# Patient Record
Sex: FEMALE | URBAN - METROPOLITAN AREA
[De-identification: names, ages, dates, MRNs, and addresses within clinical notes are randomized per-mention and may not be internally consistent; named-entity substitution may affect disease eponyms.]

---

## 2021-11-29 NOTE — PROGRESS NOTES
Chief Complaint   Other (pregnancy confirmation)      MAIKEL Sanchez is a 24 y.o. female who presents for the evaluation of pregnancy confirmation. Patient's last menstrual period was 10/19/2021 (exact date). She is 6 weeks by dates. Her menses are regular. The patient states she received 2 positive UPTs about 1 week ago. No past medical history on file. No past surgical history on file. Social History     Occupational History    Not on file   Tobacco Use    Smoking status: Never Smoker    Smokeless tobacco: Never Used   Vaping Use    Vaping Use: Never used   Substance and Sexual Activity    Alcohol use: Not Currently    Drug use: Never    Sexual activity: Yes     Partners: Male     Birth control/protection: None     No family history on file.     No Known Allergies  Prior to Admission medications    Not on File        Review of Systems: History obtained from the patient  Constitutional: negative for weight loss, fever, night sweats  HEENT: negative for hearing loss, earache, congestion, snoring, sorethroat  CV: negative for chest pain, palpitations, edema  Resp: negative for cough, shortness of breath, wheezing  Breast: negative for breast lumps, nipple discharge, galactorrhea  GI: negative for change in bowel habits, abdominal pain, black or bloody stools  : negative for frequency, dysuria, hematuria, vaginal discharge  MSK: negative for back pain, joint pain, muscle pain  Skin: negative for itching, rash, hives  Neuro: negative for dizziness, headache, confusion, weakness  Psych: negative for anxiety, depression, change in mood  Heme/lymph: negative for bleeding, bruising, pallor    Objective:  Visit Vitals  /62   Wt 123 lb (55.8 kg)   LMP 10/19/2021 (Exact Date)       Physical Exam:   PHYSICAL EXAMINATION    Constitutional  · Appearance: well-nourished, well developed, alert, in no acute distress      Skin  · General Inspection: no rash, no lesions identified    Neurologic/Psychiatric  · Mental Status:  · Orientation: grossly oriented to person, place and time  · Mood and Affect: mood normal, affect appropriate    UPT positive    Assessment:   IUP 6 weeks    Plan:   Given Phenergan for nausea; start PNV  RTO 2 weeks for US and NOB appt

## 2021-11-30 ENCOUNTER — OFFICE VISIT (OUTPATIENT)
Dept: OBGYN CLINIC | Age: 21
End: 2021-11-30
Payer: COMMERCIAL

## 2021-11-30 VITALS — WEIGHT: 123 LBS | DIASTOLIC BLOOD PRESSURE: 62 MMHG | SYSTOLIC BLOOD PRESSURE: 100 MMHG

## 2021-11-30 DIAGNOSIS — N92.6 MISSED MENSES: Primary | ICD-10-CM

## 2021-11-30 LAB
HCG URINE, QL. (POC): POSITIVE
VALID INTERNAL CONTROL?: YES

## 2021-11-30 PROCEDURE — 81025 URINE PREGNANCY TEST: CPT | Performed by: OBSTETRICS & GYNECOLOGY

## 2021-11-30 PROCEDURE — 99203 OFFICE O/P NEW LOW 30 MIN: CPT | Performed by: OBSTETRICS & GYNECOLOGY

## 2021-11-30 RX ORDER — PROMETHAZINE HYDROCHLORIDE 25 MG/1
25 TABLET ORAL
Qty: 30 TABLET | Refills: 1 | Status: SHIPPED | OUTPATIENT
Start: 2021-11-30

## 2021-11-30 NOTE — PATIENT INSTRUCTIONS
ãä ÇáÔåÑ 6 Åáì 10 ãä ÇáÍãá: ÅÑÔÇÏÇÊ ÇáÑÚÇíÉ  Weeks 6 to 10 of Your Pregnancy: Care Instructions  ÅÑÔÇÏÇÊ ÇáÑÚÇíÉ ÇáÎÇÕÉ Èß    ÊåÇäíäÇ Úáì Íãáß. Åäå æÞÊ ãËíÑ æãåã HHJTRGR áßí. ÎáÇá ÇáÃÓÇÈíÚ ãä 6 Åáì 10 ÇáÃæÇÆá ãä Íãáß¡ ÊÍÏË ÇáßËíÑ ãä ÇáÊÛíÑÇÊ Ýí ÌÓãß. íäãæ ØÝáß ÈÓÑÚÉ ßÈíÑÉ¡ ÍÊì ÅÐÇ ßäÊö áÇ ÊÔÚÑíä ÈÐáß ÈÚÏ. ÞÏ ÊÈÏÃíä Ýí RECIBB ÔÚæÑß EMSOKYVME¡ Ýí ßá ãä ÌÓãß æãÔÇÚÑß. áÇ íæÌÏ Ôßá ãÚíä ÕÍíÍ ááãÔÇÚÑ¡ áÃä ßá ÇãÑÃÉ íßæä ÍãáåÇ ÝÑíÏðÇ. ÞÏ ÊÔÚÑíä Ãäß Ýí ERNESTO Gray Юлия DXLF ããÇ ßäÊö Úáíå Úáì BLIYOMT¡ Ãæ ÊÔÚÑíä NHMLNIJD¡ Ãæ ÊÚÈ Ýí ÇáãÚÏÉ (\"ÊÚÈ ÇáÕÈÇÍ\"). åÐå ÇáÃÓÇÈíÚ ÇáÃæáì åí ÇáæÞÊ ÇáãäÇÓÈ áÇÊÎÇÐ ÞÑÇÑÇÊ ÕÍíÉ æÊäÇæá ÃÝÖá ÇáÃØÚãÉ áßö æáØÝáß. ÓÊÞÏã áßö æÑÞÉ ÇáÑÚÇíÉ åÐå ÈÚÏ ÇáÃÝßÇÑ. ßãÇ Ãäå æÞÊ ÌíÏ ááÊÝßíÑ Ýí ÅÌÑÇÁ ÇÎÊÈÇÑ ÇáÚíæÈ ÇáÎáÞíÉ Ýí ÇáãæáæÏ. ÅäåÇ XLLADWJBMV ÇáÊí íÊã ÅÌÑÇÄåÇ ÎáÇá ÇáÍãá áÇßÊÔÇÝ BFYZPWZB UAZYSKQI Ýí ÇáØÝá. íãßä ÅÌÑÇÁ ÇÎÊÈÇÑÇÊ ÇáÚíæÈ ÇáÎáÞíÉ Ýí ÇáãæáæÏ ÇáÎÇÕÉ ÈÇáËáË ÇáÃæá ÝíãÇ Èíä ÇáÃÓÈæÚ 10 æ13 ãä ÇáÍãá¡ RZPRZSZE Åáì ÇáÇÎÊÈÇÑ. ÊÍÏËí Åáì ØÈíÈß Íæá ÃäæÇÚ GUZBSUBPWM ÇáãÊÇÍÉ. ÊõÚÏ ÑÚÇíÉ ÇáãÊÇÈÚÉ ÌÒÁðÇ ÃÓÇÓíðÇ Ýí ÚáÇÌß æÓáÇãÊß. ÝÚáíß ÇáÍÑÕ Úáì ÊÑÊíÈ ÌãíÚ ãæÇÚíÏ ÒíÇÑÉ ÇáØÈíÈ IATMKCGJV ÈåÇ¡ ZFPBTBBP ÈØÈíÈß ÚäÏ KEJTFQAO ãä Ãí ãÔßáÇÊ. æãä ÇáÌíÏ ÃíÖðÇ Ãä ÊÚÑÝ äÊÇÆÌ BJKFPDLZ æßÐáß VRYVJGNS ÈÞÇÆãÉ ÇáÃÏæíÉ ÇáÊí JVMNYJEW. ßíÝ íãßäß ÇáÇÚÊäÇÁ ÈäÝÓß Ýí ÇáãäÒá¿  ÇáÊÛÐíÉ ÇáÌíÏÉ   ÊäÇæáí ãÇ áÇ íÞá Úä 3 æÌÈÇÊ ææÌÈÊíä ÎÝíÝÊíä ÕÍíÊíä íæãíðÇ. ÊäÇæáí ÃØÚãÉ ØÇÒÌÉ RZRUPHK¡ ÈãÇ Ýí Ðáß:   o 7 ÍÕÕ Ãæ ÃßËÑ ãä ÇáÎÈÒ¡ Ãæ ÇáÊæÑÊíáÇ¡ Ãæ NIYXHZ¡ Ãæ ÇáÃÑÒ¡ Ãæ UZMACUPT¡ ÇáÔæÝÇä. o 3 ÍÕÕ Ãæ ÃßËÑ ãä CZSHTNNQ¡ ÎÕæÕðÇ ÇáÎÖÑæÇÊ ÇáæÑÞíÉ ÇáÎÖÑÇÁ. o ÍÕÊíä Ãæ ÃßËÑ ãä ÇáÝÇßåÉ. o 3 ÍÕÕ Ãæ ÃßËÑ ãä ÇáÍáíÈ¡ Ãæ ÇáÒÈÇÏí¡ Ãæ ÇáÌÈä. o ÍÕÊíä Ãæ ÃßËÑ ãä XEVYY¡ Ãæ ÇáÏíß ÇáÑæãí¡ Ãæ ÇáÏÌÇÌ¡ Ãæ ÇáÓãß¡ Ãæ ÇáÈíÖ¡ Ãæ KBVQILORS ÇáÌÇÝÉ.  ÇÔÑÈí ßãíÇÊ æÝíÑÉ ãä VTIONPT¡ ÎÕæÕðÇ ÇáãÇÁ. ÊÌäÈí ÇáÕæÏÇ KJOLLIBYFE ÇáãÍáÇÉ ÇáÃÎÑì.  ÊÎíÑí ÇáÃØÚãÉ ÇáÊí ÊÍÊæí Úáì ÇáÝíÊÇãíäÇÊ ÇáãåãÉ áØÝáß¡ ãËá ÇáßÇáÓíæã¡ æÇáÍÏíÏ¡ æÍãÖ ÇáÝæáíß.    o ÊÚÊÈÑ ãäÊÌÇÊ ÇáÃáÈÇä¡ æÇáÊæÝæ¡ æÇáÃÓãÇß ÇáãÚáÈÉ ÛíÑ ÇáãÎáíÉ¡ æÇááæÒ¡ æÇáÈÑæßáí¡ LHLJQPAFS ÇáæÑÞíÉ ÇáÏÇßäÉ¡ æÊæÑÊíáÇ ÇáÐÑÉ¡ æÚÕíÑ WEGUTTSH ÇáãÍÕä ãÕÇÏÑ ÌÏíÉ ááßÇáÓíæã. o ßãÇ Ãä UEAMA ÇáÈÞÑí¡ SHPXBQLJ¡ æÇáßÈÏ¡ QASOCPTD¡ æÇáÚÏÓ¡ æÈÞæáíÇÊ ÇáãÌÝÝÉ¡ AGBDMOL FKKWYTO¡ æÇáÝæÇßå ÇáãÌÝÝÉ ÛäíÉ ÈÇáÍÏíÏ. o Laura Coffin TVOFFSXN ÇáæÑÞíÉ ÇáÏÇßäÉ¡ JTTRFFWQF¡ æäÈÇÊ Çáåáíæä¡ æÇáßÈÏ¡ æÇáÍÈæÈ ECZHKZV¡ æÚÕíÑ IBPNBOBG¡ æÇáÝæá ÇáÓæÏÇäí¡ æÇááæÒ ãÕÇÏÑ ÌíÏÉ áÍãÖ ÇáÝæáíß.  ÊÌäÈí ÇáÃØÚãÉ ÇáÊí íãßä Ãä Êßæä ãÖÑÉ áØÝáß. o áÇ ÊÊäÇæáí ÇááÍã¡Ãæ ÇáÏÌÇÌ¡ Ãæ ÇáÓãß ÇáäíÆ Ãæ ÛíÑ ÇáãØÈæÎ ÌíÏðÇ (ãËá ÇáÓæÔí Ãæ ÇáãÍÇÑ ÇáäíÁ). o áÇ ÊÊäÇæáí ÇáÈíÖ ÇáäíÁ Ãæ ÇáÃØÚãÉ ÇáÊí ÊÍÊæí Úáíå¡ ãËá ÕáÕÉ ÓíÒÑ. o áÇ ÊÊäÇæáí ÇáÃÌÈÇä ÇáØÑíÉ æãäÊÌÇÊ ÇáÃáÈÇä ÛíÑ ÇáãÈÓÊÑÉ¡ ãËá ÇáÌÈä ÇáÃÈíÖ ÇáØÑí¡ Ãæ ÇáÝíÊÇ¡ Ãæ ÌÈä ÑæßÝæÑ. o áÇ ÊÊäÇæáí ÃÓãÇßðÇ ÊÍÊæí Úáì ÇáßËíÑ ãä ÇáÒÆÈÞ¡ ãËá Óãß ÇáÞÑÔ Ãæ Óãß ÃÈæ ÓíÝ Ãæ ÇáÊáÝíÔ Ãæ ÇáãÇßÑíá Çáãáßí. áÇ ÊÊäÇæáí ÃßËÑ ãä 6 ÃæäÕÇÊ ãä ÇáÊæäÉ ÃÓÈæÚíðÇ. o áÇ ÊÊäÇæáí ÇáßÑäÈ ÇáäíÁ¡ ÎÕæÕðÇ ÈÑÇÚã ÇáÈÑÓíã. o Þááí ãä ÊäÇæá ÇáßÇÝííä¡ ãËá ÇáÞåæÉ æÇáÔÇí æÇáßæáÇ. Þæãí ÈæÞÇíÉ äÝÓß æØÝáß   áÇ ÊáãÓí ÝÖáÇÊ ÇáÞØØ Ãæ ÈÑÇÒåÇ. íãßä Ãä íÄÏæÇ Åáì ÚÏæì ãä Çáããßä Ãä ÊÖÑ ØÝáß.  íãßä Ãä Êßæä ÏÑÌÉ ÍÑÇÑÉ ÇáÌÓã ÇáãÑÊÝÚÉ ãÖÑÉ áØÝáß. áÐáß ÅÐÇ ßäÊö ÊÑÛÈíä Ýí ÇÓÊÎÏÇã ÍãÇãÇÊ ÇáÈÎÇÑ Ãæ ÇáÈÇäíæ ÇáÓÇÎä¡ ÝÊÍÏËí Åáì ÇáØÈíÈß Íæá ßíÝíÉ DNDFAMCDI ÈÃãÇä. ÇáÊÕÏí Åáì ÛËíÇä ÇáÕÈÇÍ   ÇÑÊÔÝí ßãíÇÊ ÞáíáÉ ãä ÇáãÇÁ¡ Ãæ MBBAGZW¡ Ãæ SZGFAEULK. ÍÇæáí Ãä ÊÔÑÈí Èíä ÇáæÌÈÇÊ¡ áíÓ ãÚ ÇáæÌÈÇÊ.  ÊäÇæáí 5 Ãæ 6 æÌÈÇÊ ÕÛíÑÉ íæãíðÇ. ÌÑÈí ÇáÎÈÒ ÇáãÍãÕ Ãæ ÇáÈÓßæíÊ ÈãÌÑÏ ÇáÇÓÊíÞÇÙ¡ æÊäÇæá æÌÈÉ ÇáÅÝØÇÑ ÈÚÏ Ðáß ÈÞáíá.  ÊÌäÈí ÇáÃØÚãÉ ÇáÍÇÑÉ¡ æÇáÏåäíÉ¡ æÇáÏÓãÉ.  ÚäÏãÇ ÊÔÚÑíä ÈÇáÊÚÈ¡ ÇÝÊÍí ÇáäæÇÝÐ Ãæ ÇÐåÈí ááÓíÑ ÞáíáÇð ááÍÕæá Úáì åæÇÁ äÞí.  ÌÑÈí ÇáÃÓÇæÑ ÇáãÎÕÕÉ ááÛËíÇä. ÅäåÇ ÊÓÇÚÏ ÈÚÖ ÇáÓíÏÇÊ.  ÇÎÈÑí ØÈíÈß ÅÐÇ ßäÊö ÊÔÚÑíä Ãä ÝíÊÇãíäÇÊ ãÇ ÞÈá ÇáæáÇÏÉ ÊÕíÈß ÈÇáÊÚÈ. Ãíä íãßäß ãÚÑÝÉ ÇáãÒíÏ¿  ÇäÊÞÇá Åáì   [email or web address]  ÃÏÎá G112 Ýí ãÑÈÚ ÇáÈÍË áãÚÑÝÉ ÇáãÒíÏ Íæá \"ãä ÇáÔåÑ 6 Åáì 10 ãä ÇáÍãá: ÅÑÔÇÏÇÊ ÇáÑÚÇíÉ. \"  ÓÇÑò YPUSWDNO ãä: 16 ÍÒíÑÇä 2021               äÓÎÉ ÇáãÍÊæì: 13.0  © 8662-6814 [employer], [employer]orporated. Êã ÊÚÏíá ÅÑÔÇÏÇÊ ÇáÑÚÇíÉ ÈãæÌÈ ÊÑÎíÕ ÕÇÏÑ ãä ÇÎÊÕÇÕí ÇáÑÚÇíÉ ÇáÕÍíÉ ÇáÎÇÕ Èß. ÅÐÇ ßÇäÊ áÏíß ÃÓÆáÉ NFXYE ÈÍÇáÉ ãÑÖíÉ Ãæ ÈåÐå ÇáÊÚáíãÇÊ¡ ÝÇÍÑÕ Úáì ÇáÑÌæÚ ÏÇÆãðÇ Åáì ÇÎÊÕÇÕí ÇáÑÚÇíÉ ÇáÕÍíÉ. ÊõÎáí ÔÑßÉ State of Ambition VNASKDSFO Úä Ãí ÖãÇä Ãæ ÇáÊÒÇã íÊÚáÞ BTJPTHRUG áåÐå [id number].

## 2021-12-15 ENCOUNTER — ROUTINE PRENATAL (OUTPATIENT)
Dept: OBGYN CLINIC | Age: 21
End: 2021-12-15

## 2021-12-15 VITALS — SYSTOLIC BLOOD PRESSURE: 96 MMHG | DIASTOLIC BLOOD PRESSURE: 50 MMHG | WEIGHT: 122 LBS

## 2021-12-15 DIAGNOSIS — Z34.90 PREGNANCY, UNSPECIFIED GESTATIONAL AGE: Primary | ICD-10-CM

## 2021-12-15 PROCEDURE — 99203 OFFICE O/P NEW LOW 30 MIN: CPT | Performed by: OBSTETRICS & GYNECOLOGY

## 2021-12-15 PROCEDURE — 0500F INITIAL PRENATAL CARE VISIT: CPT | Performed by: OBSTETRICS & GYNECOLOGY

## 2021-12-15 NOTE — PATIENT INSTRUCTIONS
ãä ÇáÔåÑ 6 Åáì 10 ãä ÇáÍãá: ÅÑÔÇÏÇÊ ÇáÑÚÇíÉ  Weeks 6 to 10 of Your Pregnancy: Care Instructions  ÅÑÔÇÏÇÊ ÇáÑÚÇíÉ ÇáÎÇÕÉ Èß    ÊåÇäíäÇ Úáì Íãáß. Åäå æÞÊ ãËíÑ æãåã AYOEJDI áßí. ÎáÇá ÇáÃÓÇÈíÚ ãä 6 Åáì 10 ÇáÃæÇÆá ãä Íãáß¡ ÊÍÏË ÇáßËíÑ ãä ÇáÊÛíÑÇÊ Ýí ÌÓãß. íäãæ ØÝáß ÈÓÑÚÉ ßÈíÑÉ¡ ÍÊì ÅÐÇ ßäÊö áÇ ÊÔÚÑíä ÈÐáß ÈÚÏ. ÞÏ ÊÈÏÃíä Ýí MQDNDA ÔÚæÑß QVZWQKIYP¡ Ýí ßá ãä ÌÓãß æãÔÇÚÑß. áÇ íæÌÏ Ôßá ãÚíä ÕÍíÍ ááãÔÇÚÑ¡ áÃä ßá ÇãÑÃÉ íßæä ÍãáåÇ ÝÑíÏðÇ. ÞÏ ÊÔÚÑíä Ãäß Ýí XDOO Lily Sleight MXCY ããÇ ßäÊö Úáíå Úáì ZGEPUFF¡ Ãæ ÊÔÚÑíä DDBPVWBE¡ Ãæ ÊÚÈ Ýí ÇáãÚÏÉ (\"ÊÚÈ ÇáÕÈÇÍ\"). åÐå ÇáÃÓÇÈíÚ ÇáÃæáì åí ÇáæÞÊ ÇáãäÇÓÈ áÇÊÎÇÐ ÞÑÇÑÇÊ ÕÍíÉ æÊäÇæá ÃÝÖá ÇáÃØÚãÉ áßö æáØÝáß. ÓÊÞÏã áßö æÑÞÉ ÇáÑÚÇíÉ åÐå ÈÚÏ ÇáÃÝßÇÑ. ßãÇ Ãäå æÞÊ ÌíÏ ááÊÝßíÑ Ýí ÅÌÑÇÁ ÇÎÊÈÇÑ ÇáÚíæÈ ÇáÎáÞíÉ Ýí ÇáãæáæÏ. ÅäåÇ GJEHZKLURZ ÇáÊí íÊã ÅÌÑÇÄåÇ ÎáÇá ÇáÍãá áÇßÊÔÇÝ YOFPOEDY VZSURROU Ýí ÇáØÝá. íãßä ÅÌÑÇÁ ÇÎÊÈÇÑÇÊ ÇáÚíæÈ ÇáÎáÞíÉ Ýí ÇáãæáæÏ ÇáÎÇÕÉ ÈÇáËáË ÇáÃæá ÝíãÇ Èíä ÇáÃÓÈæÚ 10 æ13 ãä ÇáÍãá¡ DCDZTCKY Åáì ÇáÇÎÊÈÇÑ. ÊÍÏËí Åáì ØÈíÈß Íæá ÃäæÇÚ YQESYFXSLX ÇáãÊÇÍÉ. ÊõÚÏ ÑÚÇíÉ ÇáãÊÇÈÚÉ ÌÒÁðÇ ÃÓÇÓíðÇ Ýí ÚáÇÌß æÓáÇãÊß. ÝÚáíß ÇáÍÑÕ Úáì ÊÑÊíÈ ÌãíÚ ãæÇÚíÏ ÒíÇÑÉ ÇáØÈíÈ KQOLRKPUX ÈåÇ¡ RJTSUYBL ÈØÈíÈß ÚäÏ XEDBEOZY ãä Ãí ãÔßáÇÊ. æãä ÇáÌíÏ ÃíÖðÇ Ãä ÊÚÑÝ äÊÇÆÌ MURZFKDU æßÐáß OZNSLCBV ÈÞÇÆãÉ ÇáÃÏæíÉ ÇáÊí CKYTOLAQ. ßíÝ íãßäß ÇáÇÚÊäÇÁ ÈäÝÓß Ýí ÇáãäÒá¿  ÇáÊÛÐíÉ ÇáÌíÏÉ   ÊäÇæáí ãÇ áÇ íÞá Úä 3 æÌÈÇÊ ææÌÈÊíä ÎÝíÝÊíä ÕÍíÊíä íæãíðÇ. ÊäÇæáí ÃØÚãÉ ØÇÒÌÉ CVENRYH¡ ÈãÇ Ýí Ðáß:   o 7 ÍÕÕ Ãæ ÃßËÑ ãä ÇáÎÈÒ¡ Ãæ ÇáÊæÑÊíáÇ¡ Ãæ COQQMB¡ Ãæ ÇáÃÑÒ¡ Ãæ YOCRUCGQ¡ ÇáÔæÝÇä. o 3 ÍÕÕ Ãæ ÃßËÑ ãä RWZZNMFC¡ ÎÕæÕðÇ ÇáÎÖÑæÇÊ ÇáæÑÞíÉ ÇáÎÖÑÇÁ. o ÍÕÊíä Ãæ ÃßËÑ ãä ÇáÝÇßåÉ. o 3 ÍÕÕ Ãæ ÃßËÑ ãä ÇáÍáíÈ¡ Ãæ ÇáÒÈÇÏí¡ Ãæ ÇáÌÈä. o ÍÕÊíä Ãæ ÃßËÑ ãä WUYCN¡ Ãæ ÇáÏíß ÇáÑæãí¡ Ãæ ÇáÏÌÇÌ¡ Ãæ ÇáÓãß¡ Ãæ ÇáÈíÖ¡ Ãæ ODDCOQMJQ ÇáÌÇÝÉ.  ÇÔÑÈí ßãíÇÊ æÝíÑÉ ãä HDBKRQD¡ ÎÕæÕðÇ ÇáãÇÁ. ÊÌäÈí ÇáÕæÏÇ IDNSQSZEHT ÇáãÍáÇÉ ÇáÃÎÑì.  ÊÎíÑí ÇáÃØÚãÉ ÇáÊí ÊÍÊæí Úáì ÇáÝíÊÇãíäÇÊ ÇáãåãÉ áØÝáß¡ ãËá ÇáßÇáÓíæã¡ æÇáÍÏíÏ¡ æÍãÖ ÇáÝæáíß.    o ÊÚÊÈÑ ãäÊÌÇÊ ÇáÃáÈÇä¡ æÇáÊæÝæ¡ æÇáÃÓãÇß ÇáãÚáÈÉ ÛíÑ ÇáãÎáíÉ¡ æÇááæÒ¡ æÇáÈÑæßáí¡ RCGDAJZRM ÇáæÑÞíÉ ÇáÏÇßäÉ¡ æÊæÑÊíáÇ ÇáÐÑÉ¡ æÚÕíÑ NKVMJBXI ÇáãÍÕä ãÕÇÏÑ ÌÏíÉ ááßÇáÓíæã. o ßãÇ Ãä KKZPG ÇáÈÞÑí¡ XVKBSLHT¡ æÇáßÈÏ¡ QEYAMFLX¡ æÇáÚÏÓ¡ æÈÞæáíÇÊ ÇáãÌÝÝÉ¡ ITGHEAA IBJEBYE¡ æÇáÝæÇßå ÇáãÌÝÝÉ ÛäíÉ ÈÇáÍÏíÏ. o Caitlin Mauritian KGGDRXQB ÇáæÑÞíÉ ÇáÏÇßäÉ¡ CPZFLJTRZ¡ æäÈÇÊ Çáåáíæä¡ æÇáßÈÏ¡ æÇáÍÈæÈ LXVFATG¡ æÚÕíÑ OHUJWODC¡ æÇáÝæá ÇáÓæÏÇäí¡ æÇááæÒ ãÕÇÏÑ ÌíÏÉ áÍãÖ ÇáÝæáíß.  ÊÌäÈí ÇáÃØÚãÉ ÇáÊí íãßä Ãä Êßæä ãÖÑÉ áØÝáß. o áÇ ÊÊäÇæáí ÇááÍã¡Ãæ ÇáÏÌÇÌ¡ Ãæ ÇáÓãß ÇáäíÆ Ãæ ÛíÑ ÇáãØÈæÎ ÌíÏðÇ (ãËá ÇáÓæÔí Ãæ ÇáãÍÇÑ ÇáäíÁ). o áÇ ÊÊäÇæáí ÇáÈíÖ ÇáäíÁ Ãæ ÇáÃØÚãÉ ÇáÊí ÊÍÊæí Úáíå¡ ãËá ÕáÕÉ ÓíÒÑ. o áÇ ÊÊäÇæáí ÇáÃÌÈÇä ÇáØÑíÉ æãäÊÌÇÊ ÇáÃáÈÇä ÛíÑ ÇáãÈÓÊÑÉ¡ ãËá ÇáÌÈä ÇáÃÈíÖ ÇáØÑí¡ Ãæ ÇáÝíÊÇ¡ Ãæ ÌÈä ÑæßÝæÑ. o áÇ ÊÊäÇæáí ÃÓãÇßðÇ ÊÍÊæí Úáì ÇáßËíÑ ãä ÇáÒÆÈÞ¡ ãËá Óãß ÇáÞÑÔ Ãæ Óãß ÃÈæ ÓíÝ Ãæ ÇáÊáÝíÔ Ãæ ÇáãÇßÑíá Çáãáßí. áÇ ÊÊäÇæáí ÃßËÑ ãä 6 ÃæäÕÇÊ ãä ÇáÊæäÉ ÃÓÈæÚíðÇ. o áÇ ÊÊäÇæáí ÇáßÑäÈ ÇáäíÁ¡ ÎÕæÕðÇ ÈÑÇÚã ÇáÈÑÓíã. o Þááí ãä ÊäÇæá ÇáßÇÝííä¡ ãËá ÇáÞåæÉ æÇáÔÇí æÇáßæáÇ. Þæãí ÈæÞÇíÉ äÝÓß æØÝáß   áÇ ÊáãÓí ÝÖáÇÊ ÇáÞØØ Ãæ ÈÑÇÒåÇ. íãßä Ãä íÄÏæÇ Åáì ÚÏæì ãä Çáããßä Ãä ÊÖÑ ØÝáß.  íãßä Ãä Êßæä ÏÑÌÉ ÍÑÇÑÉ ÇáÌÓã ÇáãÑÊÝÚÉ ãÖÑÉ áØÝáß. áÐáß ÅÐÇ ßäÊö ÊÑÛÈíä Ýí ÇÓÊÎÏÇã ÍãÇãÇÊ ÇáÈÎÇÑ Ãæ ÇáÈÇäíæ ÇáÓÇÎä¡ ÝÊÍÏËí Åáì ÇáØÈíÈß Íæá ßíÝíÉ [id number] ÈÃãÇä. ÇáÊÕÏí Åáì ÛËíÇä ÇáÕÈÇÍ   ÇÑÊÔÝí ßãíÇÊ ÞáíáÉ ãä ÇáãÇÁ¡ Ãæ [id number] Ãæ [id number]. ÍÇæáí Ãä ÊÔÑÈí Èíä ÇáæÌÈÇÊ¡ áíÓ ãÚ ÇáæÌÈÇÊ.  ÊäÇæáí 5 Ãæ 6 æÌÈÇÊ ÕÛíÑÉ íæãíðÇ. ÌÑÈí ÇáÎÈÒ ÇáãÍãÕ Ãæ ÇáÈÓßæíÊ ÈãÌÑÏ ÇáÇÓÊíÞÇÙ¡ æÊäÇæá æÌÈÉ ÇáÅÝØÇÑ ÈÚÏ Ðáß ÈÞáíá.  ÊÌäÈí ÇáÃØÚãÉ ÇáÍÇÑÉ¡ æÇáÏåäíÉ¡ æÇáÏÓãÉ.  ÚäÏãÇ ÊÔÚÑíä ÈÇáÊÚÈ¡ ÇÝÊÍí ÇáäæÇÝÐ Ãæ ÇÐåÈí ááÓíÑ ÞáíáÇð ááÍÕæá Úáì åæÇÁ äÞí.  ÌÑÈí ÇáÃÓÇæÑ ÇáãÎÕÕÉ ááÛËíÇä. ÅäåÇ ÊÓÇÚÏ ÈÚÖ ÇáÓíÏÇÊ.  ÇÎÈÑí ØÈíÈß ÅÐÇ ßäÊö ÊÔÚÑíä Ãä ÝíÊÇãíäÇÊ ãÇ ÞÈá ÇáæáÇÏÉ ÊÕíÈß ÈÇáÊÚÈ. Ãíä íãßäß ãÚÑÝÉ ÇáãÒíÏ¿  ÇäÊÞÇá Åáì   [email or web address]  ÃÏÎá G112 Ýí ãÑÈÚ ÇáÈÍË áãÚÑÝÉ ÇáãÒíÏ Íæá \"ãä ÇáÔåÑ 6 Åáì 10 ãä ÇáÍãá: ÅÑÔÇÏÇÊ ÇáÑÚÇíÉ. \"  ÓÇÑò [id number] ãä: 16 ÍÒíÑÇä 2021               äÓÎÉ ÇáãÍÊæì: 13.0  © 3012-5438 [employer]. Êã ÊÚÏíá ÅÑÔÇÏÇÊ ÇáÑÚÇíÉ ÈãæÌÈ ÊÑÎíÕ ÕÇÏÑ ãä ÇÎÊÕÇÕí ÇáÑÚÇíÉ ÇáÕÍíÉ ÇáÎÇÕ Èß. ÅÐÇ ßÇäÊ áÏíß ÃÓÆáÉ HASIT ÈÍÇáÉ ãÑÖíÉ Ãæ ÈåÐå ÇáÊÚáíãÇÊ¡ ÝÇÍÑÕ Úáì ÇáÑÌæÚ ÏÇÆãðÇ Åáì ÇÎÊÕÇÕí ÇáÑÚÇíÉ ÇáÕÍíÉ. ÊõÎáí ÔÑßÉ Apprenda [id number] Úä Ãí ÖãÇä Ãæ ÇáÊÒÇã íÊÚáÞ [id number] áåÐå [email or web address].

## 2021-12-15 NOTE — PROGRESS NOTES
Current pregnancy history:    William Sorto is a 24 y.o. female who presents for the evaluation of pregnancy. Patient's last menstrual period was 10/19/2021 (exact date). LMP histo  The date of her LMP is certain. Her last menstrual period was normal and lasted for 4 to 5 days. A urine pregnancy test was positive 4 weeks ago. She was not on the pill at conception. Based on her LMP, her EDC is 22 and her EGA is 8 weeks,1 days. Her menstrual cycles are regular and occur approximately every 28 days  and range from 3 to 5 days. The last menses lasted the usual number of days. Ultrasound data:  She had an  ultrasound done by the ultrasound tech today which revealed a viable ricci pregnancy with a gestational age of 9 weeks and 6 days giving an [address] of 22. TA ULTRASOUND PERFORMED  A SINGLE VIABLE 7W6D IUP IS SEEN WITH NORMAL CARDIAC RHYTHM. GESTATIONAL AGE BASED ON TODAYS ULTRASOUND. A NORMAL YOLK SAC IS SEEN. RIGHT ADNEXA APPEARS WITHIN NORMAL LIMITS. LEFT ADNEXA APPEARS WITHIN NORMAL LIMITS. NO FREE FLUID SEEN IN THE CDS. Pregnancy symptoms:    Since her LMP she has experienced  urinary frequency, breast tenderness, and nausea. She has been vomiting over the last few weeks. Associated signs and symptoms which she denies: dysuria, discharge, vaginal bleeding. She states she has gained weight. Patient is unsure of how much she has gained. Relevant past pregnancy history:   She has the following pregnancy history: This is her first pregnancy. She has no history of  delivery. Relevant past medical history:(relevant to this pregnancy): noncontributory. Pap/Occupational history:  Last pap smear: last year Results: She has never had a pap smear. Her occupation is: Unemployed. Substance history: negative for alcohol, tobacco and street drugs. Positive for nothing. Exposure history: There is/are no indoor cat/s in the home.   The patient was instructed to not change the cat litter. She admits close contact with children on a regular basis. She has had chicken pox or the vaccine in the past.   Patient denies issues with domestic violence. Genetic Screening/Teratology Counseling: (Includes patient, baby's father, or anyone in either family with:)  3.  Patient's age >/= 28 at [facility]?-- no  .   2. Thalassemia ([facility], [facility], [address], or  background): MCV<80?--no.     3.  Neural tube defect (meningomyelocele, spina bifida, anencephaly)?--no.   4.  Congenital heart defect?--no.  5.  Down syndrome?--no.   6.  Prosper-Sachs (Latter day, [place])?--no.   7.  Canavan's Disease?--no.   8.  Familial Dysautonomia?--no.   9.  Sickle cell disease or trait ()? --no   The patient has not been tested for sickle trait  10. Hemophilia or other blood disorders?--no. 11.  Muscular dystrophy?--no. 12.  Cystic fibrosis?--no. 13.  [place]'s Chorea?--no. 14.  Mental retardation/autism (if yes was person tested for Fragile X)?--no. 15.  Other inherited genetic or chromosomal disorder?--no. 12.  Maternal metabolic disorder (DM, PKU, etc)?--no. 17.  Patient or FOB with a child with a birth defect not listed above?--no.  17a. Patient or FOB with a birth defect themselves?--no. 18.  Recurrent pregnancy loss, or stillbirth?--no. 19.  Any medications since LMP other than prenatal vitamins (include vitamins,  supplements, OTC meds, drugs, alcohol)?--no. 20.  Any other genetic/environmental exposure to discuss?--no. Infection History:  1. Lives with someone with TB or TB exposed?--no.   2.  Patient or partner has history of genital herpes?--no.  3.  Rash or viral illness since LMP?--no.    4.  History of STD (GC, CT, HPV, syphilis, HIV)? --no   5. Other: OTHER? No past medical history on file. No past surgical history on file.   Social History     Occupational History    Not on file   Tobacco Use    Smoking status: Never Smoker    Smokeless tobacco: Never Used   Vaping Use    Vaping Use: Never used   Substance and Sexual Activity    Alcohol use: Not Currently    Drug use: Never    Sexual activity: Yes     Partners: Male     Birth control/protection: None     No family history on file. No Known Allergies  Prior to Admission medications    Medication Sig Start Date End Date Taking? Authorizing Provider   PNV Comb #2-Iron-Omega 3-FA [id number] mg cmpk Take  by mouth. Yes Provider, Historical   promethazine (PHENERGAN) 25 mg tablet Take 1 Tablet by mouth every six (6) hours as needed for Nausea.  11/30/21   Aime Lawson MD        Review of Systems: History obtained from the patient  Constitutional: negative for weight loss, fever, night sweats  HEENT: negative for hearing loss, earache, congestion, snoring, sorethroat  CV: negative for chest pain, palpitations, edema  Resp: negative for cough, shortness of breath, wheezing  Breast: negative for breast lumps, nipple discharge, galactorrhea  GI: negative for change in bowel habits, abdominal pain, black or bloody stools  : negative for frequency, dysuria, hematuria, vaginal discharge  MSK: negative for back pain, joint pain, muscle pain  Skin: negative for itching, rash, hives  Neuro: negative for dizziness, headache, confusion, weakness  Psych: negative for anxiety, depression, change in mood  Heme/lymph: negative for bleeding, bruising, pallor    Objective:  Visit Vitals  BP (!) 96/50   Wt 122 lb (55.3 kg)   LMP 10/19/2021 (Exact Date)       Physical Exam:   PHYSICAL EXAMINATION    Constitutional  · Appearance: well-nourished, well developed, alert, in no acute distress    HENT  · Head  · Face: appears normal  · Eyes: appear normal  · Ears: normal  · Mouth: normal  · Lips: no lesions    Neck  · Inspection/Palpation: normal appearance, no masses or tenderness  · Lymph Nodes: no lymphadenopathy present  · Thyroid: gland size normal, nontender, no nodules or masses present on palpation    Chest  · Respiratory Effort: breathing unlabored  · Auscultation: normal breath sounds    Cardiovascular  · Heart:  · Auscultation: regular rate and rhythm without murmur    Breasts  · Inspection of Breasts: breasts symmetrical, no skin changes, no discharge present, nipple appearance normal, no skin retraction present  · Palpation of Breasts and Axillae: no masses present on palpation, no breast tenderness  · Axillary Lymph Nodes: no lymphadenopathy present    Gastrointestinal  · Abdominal Examination: abdomen non-tender to palpation, normal bowel sounds, no masses present  · Liver and spleen: no hepatomegaly present, spleen not palpable  · Hernias: no hernias identified    Genitourinary  · External Genitalia: normal appearance for age, no discharge present, no tenderness present, no inflammatory lesions present, no masses present, no atrophy present  · Vagina: normal vaginal vault without central or paravaginal defects, no discharge present, no inflammatory lesions present, no masses present  · Bladder: non-tender to palpation  · Urethra: appears normal  · Cervix: normal   · Uterus: enlarged, normal shape, soft  · Adnexa: no adnexal tenderness present, no adnexal masses present  · Perineum: perineum within normal limits, no evidence of trauma, no rashes or skin lesions present  · Anus: anus within normal limits, no hemorrhoids present  · Inguinal Lymph Nodes: no lymphadenopathy present    Skin  · General Inspection: no rash, no lesions identified    Neurologic/Psychiatric  · Mental Status:  · Orientation: grossly oriented to person, place and time  · Mood and Affect: mood normal, affect appropriate    Assessment:   Intrauterine pregnancy with the following problems identified: none.         Plan:     Offered CF testing, CVS, Nuchal Translucency, MSAFP, amnio, and discussed NIPT  Course of pregnancy discussed including visit schedule, routine U/S, glucola testing, etc.  Avoid alcoholic beverages and illicit/recreational drugs use  Take prenatal vitamins or folic acid daily. Hospital and practice style discussed with coverage system. Discussed nutrition, toxoplasmosis precautions, sexual activity, exercise, need for influenza vaccine, environmental and work hazards, travel advice, screen for domestic violence, need for seat belts. Discussed seafood, unpasteurized dairy products, deli meat, artificial sweeteners, and caffeine. Information on prenatal classes/breastfeeding given. Information on circumcision given  Patient encouraged not to smoke. Discussed current prescription drug use. Given medication list.  Discussed the use of over the counter medications and chemicals. Pt understands risk of hemorrhage during pregnancy and post delivery and would accept blood products if necessary in life-threatening emergencies      Handouts given to pt.

## 2021-12-17 LAB
ABO GROUP BLD: NORMAL
ALBUMIN SERPL-MCNC: 4.4 G/DL (ref 3.9–5)
ALBUMIN/GLOB SERPL: 1.3 {RATIO} (ref 1.2–2.2)
ALP SERPL-CCNC: 52 IU/L (ref 44–121)
ALT SERPL-CCNC: 9 IU/L (ref 0–32)
AST SERPL-CCNC: 15 IU/L (ref 0–40)
BACTERIA UR CULT: NO GROWTH
BILIRUB SERPL-MCNC: 0.4 MG/DL (ref 0–1.2)
BLD GP AB SCN SERPL QL: NEGATIVE
BUN SERPL-MCNC: 8 MG/DL (ref 6–20)
BUN/CREAT SERPL: 14 (ref 9–23)
CALCIUM SERPL-MCNC: 9.6 MG/DL (ref 8.7–10.2)
CHLORIDE SERPL-SCNC: 98 MMOL/L (ref 96–106)
CO2 SERPL-SCNC: 22 MMOL/L (ref 20–29)
CREAT SERPL-MCNC: 0.57 MG/DL (ref 0.57–1)
ERYTHROCYTE [DISTWIDTH] IN BLOOD BY AUTOMATED COUNT: 13.6 % (ref 11.7–15.4)
GLOBULIN SER CALC-MCNC: 3.4 G/DL (ref 1.5–4.5)
GLUCOSE SERPL-MCNC: 76 MG/DL (ref 65–99)
HBV SURFACE AG SERPL QL IA: NEGATIVE
HCT VFR BLD AUTO: 36.9 % (ref 34–46.6)
HCV AB S/CO SERPL IA: <0.1 S/CO RATIO (ref 0–0.9)
HGB A MFR BLD ELPH: 97.2 % (ref 96.4–98.8)
HGB A2 MFR BLD ELPH: 2.8 % (ref 1.8–3.2)
HGB BLD-MCNC: 12 G/DL (ref 11.1–15.9)
HGB F MFR BLD ELPH: 0 % (ref 0–2)
HGB FRACT BLD-IMP: NORMAL
HGB S MFR BLD ELPH: 0 %
HIV 1+2 AB+HIV1 P24 AG SERPL QL IA: NON REACTIVE
MCH RBC QN AUTO: 28.8 PG (ref 26.6–33)
MCHC RBC AUTO-ENTMCNC: 32.5 G/DL (ref 31.5–35.7)
MCV RBC AUTO: 89 FL (ref 79–97)
PLATELET # BLD AUTO: 204 X10E3/UL (ref 150–450)
POTASSIUM SERPL-SCNC: 4.2 MMOL/L (ref 3.5–5.2)
PROT SERPL-MCNC: 7.8 G/DL (ref 6–8.5)
RBC # BLD AUTO: 4.16 X10E6/UL (ref 3.77–5.28)
RH BLD: POSITIVE
RUBV IGG SERPL IA-ACNC: 24.2 INDEX
SODIUM SERPL-SCNC: 132 MMOL/L (ref 134–144)
TREPONEMA PALLIDUM IGG+IGM AB [PRESENCE] IN SERUM OR PLASMA BY IMMUNOASSAY: NON REACTIVE
WBC # BLD AUTO: 10.2 X10E3/UL (ref 3.4–10.8)

## 2021-12-19 LAB
C TRACH RRNA CVX QL NAA+PROBE: NEGATIVE
CYTOLOGIST CVX/VAG CYTO: NORMAL
CYTOLOGY CVX/VAG DOC CYTO: NORMAL
CYTOLOGY CVX/VAG DOC THIN PREP: NORMAL
DX ICD CODE: NORMAL
LABCORP, 190119: NORMAL
Lab: NORMAL
N GONORRHOEA RRNA CVX QL NAA+PROBE: NEGATIVE
OTHER STN SPEC: NORMAL
STAT OF ADQ CVX/VAG CYTO-IMP: NORMAL
T VAGINALIS RRNA SPEC QL NAA+PROBE: NEGATIVE

## 2022-01-12 ENCOUNTER — ROUTINE PRENATAL (OUTPATIENT)
Dept: OBGYN CLINIC | Age: 22
End: 2022-01-12
Payer: COMMERCIAL

## 2022-01-12 VITALS — SYSTOLIC BLOOD PRESSURE: 110 MMHG | WEIGHT: 119 LBS | DIASTOLIC BLOOD PRESSURE: 72 MMHG

## 2022-01-12 DIAGNOSIS — Z34.90 PREGNANCY, UNSPECIFIED GESTATIONAL AGE: Primary | ICD-10-CM

## 2022-01-12 PROCEDURE — 0502F SUBSEQUENT PRENATAL CARE: CPT | Performed by: OBSTETRICS & GYNECOLOGY

## 2022-01-12 RX ORDER — ONDANSETRON 8 MG/1
8 TABLET, ORALLY DISINTEGRATING ORAL
Qty: 20 TABLET | Refills: 1 | Status: SHIPPED | OUTPATIENT
Start: 2022-01-12

## 2022-01-12 NOTE — PROGRESS NOTES
3+ protein today and at first visit; CMP normal, will get 24 urine for protein;  Panorama and Horizon 4 sent

## 2022-01-24 ENCOUNTER — TELEPHONE (OUTPATIENT)
Dept: OBGYN CLINIC | Age: 22
End: 2022-01-24

## 2022-01-24 NOTE — TELEPHONE ENCOUNTER
Call received at 11:50am      24year old patient last seen in the office on 2022    Patient is  15 w6d pregnant . HIP[employer] verified to speak to  regarding his wife.  calling to report that patient has body ache , headache , nasal congestion , and sore throat     was advised need for patient to get tested for covid 23      was advised patient should rest , increase po fluids, and is suppose to quarantine for 10 days from symptoms       was advise of patient  not to take motrin or ibuprofen but that she can take tylenol     was advised of when to seek urgent care. Jocelyne FRANK

## 2022-02-09 ENCOUNTER — ROUTINE PRENATAL (OUTPATIENT)
Dept: OBGYN CLINIC | Age: 22
End: 2022-02-09
Payer: COMMERCIAL

## 2022-02-09 VITALS — SYSTOLIC BLOOD PRESSURE: 102 MMHG | DIASTOLIC BLOOD PRESSURE: 60 MMHG | WEIGHT: 123 LBS

## 2022-02-09 DIAGNOSIS — O12.12 GESTATIONAL PROTEINURIA IN SECOND TRIMESTER: ICD-10-CM

## 2022-02-09 DIAGNOSIS — Z34.90 PREGNANCY, UNSPECIFIED GESTATIONAL AGE: Primary | ICD-10-CM

## 2022-02-09 LAB
COLLECT DURATION TIME UR: 24 HR
PROT 24H UR-MRATE: 138 MG/24HR
SPECIMEN VOL ?TM UR: 2300 ML

## 2022-02-09 PROCEDURE — 0502F SUBSEQUENT PRENATAL CARE: CPT | Performed by: OBSTETRICS & GYNECOLOGY

## 2022-02-09 NOTE — PATIENT INSTRUCTIONS
ãä ÇáÔåÑ 14 Åáì 18 ãä ÇáÍãá: ÅÑÔÇÏÇÊ ÇáÑÚÇíÉ  Weeks 14 to 25 of Your Pregnancy: Care Instructions  ÅÑÔÇÏÇÊ ÇáÑÚÇíÉ ÇáÎÇÕÉ Èß    ÎáÇá åÐå ÇáÝÊÑÉ¡ ÞÏ ÊÈÏÃíä Ýí \"ÇáÙåæÑ\"¡ áÐáß íÙåÑ Úáíßö YWZKJ ááÃÔÎÇÕ ãä Íæáß. ÞÏ ÊáÇÍÙíä ÃíÖðÇ ÈÚÖ ÇáÊÛíÑÇÊ Ýí ÈÔÑÊß¡ ãËá ÍßÉ Ýí ÃãÇßä Ýí ßÝíßö Ãæ ÍÈæÈ Ýí æÌåßö. ÇáÂä ØÝáß íãßäå FVSMPB¡ æÈÏÃ ÊÌãÚ Ãæá ÈÑÇÒ (ÚöÞúíø) Ýí ÃãÚÇÄå. ßãÇ ÈÏÃ íäãæ ÇáÔÚÑ ÃíÖðÇ Úáì ÑÃÓ ØÝáß. Ýí ÒíÇÑÊß ÇáÊÇáíÉ ááØÈíÈ Èíä ÇáÃÓÈæÚ 18 æ20 ÞÏ íÌÑí áßö ÇÎÊÈÇÑ MLEDEHLL ÝæÞ ÇáÕæÊíÉ. íÓãÍ ÇáÇÎÊÈÇÑ ááØÈíÈ ÈÝÍÕ ãÔßáÇÊ ãÚíäÉ. ßãÇ íãßä Ãä íÎÈÑßö ÇáØÈíÈ ÃíÖðÇ ÈäæÚ ÇáØÝá. Åäå ÇáæÞÊ ÇáãäÇÓÈ ááÊÝßíÑ ÝíãÇ ÅÐÇ ßäÊö ÊÑÛÈíä Ýí ãÚÑÝÉ äæÚ ÌäÓ ÇáØÝá Ãã áÇ. ÊÍÏËí Åáì ØÈíÈßö ÈÔÃä ÃÎÐ ÍÞäÉ WTPMYIVTGH ááãÓÇÚÏÉ Ýí ÇáÍÝÇÙ Úáì ÍÇáÊß ÇáÕÍíÉ ÃËäÇÁ XSHCA. ãä ÇáØÈíÚí Ãä ÊÔÚÑíä RMFQXW Ãæ ÇáÊÔæÞ ÈíäãÇ ÊØæá ÝÊÑÉ Íãáßö. åäÇß ÇáßËíÑ ãä ÇáÊÛíÑÇÊ ÇáÊí ÊÍÏË Ýí ÌÓãß. æãÚ Ðáß ÊÝßÑíä Ýí KZPBGNS¡ æÕÍÉ TQLTR¡ æÃäßö ÓÊÕÈÍíä ÃãðÇ. íãßäß ÊÚáã ÇáÊÕÏí áÃí ÞáÞ Ãæ ÖÛØ ÊÔÚÑíä Èå. ÊõÚÏ ÑÚÇíÉ ÇáãÊÇÈÚÉ ÌÒÁðÇ ÃÓÇÓíðÇ Ýí ÚáÇÌß æÓáÇãÊß. ÝÚáíß ÇáÍÑÕ Úáì ÊÑÊíÈ ÌãíÚ ãæÇÚíÏ ÒíÇÑÉ ÇáØÈíÈ JDZCUMZCK ÈåÇ¡ THLOKRTY ÈØÈíÈß ÚäÏ CITUOPMX ãä Ãí ãÔßáÇÊ. æãä ÇáÌíÏ ÃíÖðÇ Ãä ÊÚÑÝ äÊÇÆÌ WZKFVPCB æßÐáß JROTOQJZ ÈÞÇÆãÉ ÇáÃÏæíÉ ÇáÊí BULYBMSH. ßíÝ íãßäß ÇáÇÚÊäÇÁ ÈäÝÓß Ýí ZROZCQ¿  Þáá ÇáÊæÊÑ   ÇØáÈí ÇáãÓÇÚÏÉ Ýí ÇáØåí æÃÚãÇá ÇáãäÒá.  ÍÏÏí ÃÓÈÇÈ ÖÛæØß Ãæ ÇáãÊÓÈÈ ÝíåÇ. ÊÌäÈí åÄáÇÁ ÇáÃÔÎÇÕ Ãæ ÇáãæÇÞÝ ÈÞÏÑ ÇáÅãßÇä.  Þæãí ÈÇáÇÓÊÑÎÇÁ íæãíðÇ. ãä ÔÃä ÃÎÐ ÝÊÑÇÊ ÑÇÍÉ ãä 10 Åáì 15 ÏÞíÞÉ íæãíðÇ Ãä íõÍÏË ÝÑÞðÇ ßÈíÑðÇ. ÇÐåÈí ááÓíÑ¡ Ãæ ÇáÇÓÊãÇÚ Åáì ÇáãæÓíÞì¡ Ãæ ZNHXRFZJT ÈãÇÁ ÏÇÝÆ.  ÊÚÑÝí Úáì ÃÓÇáíÈ ÇáÇÓÊÑÎÇÁ ÃÍÏ ÇáÝÕæá ÇáãÎÕÕÉ áãÇ ÞÈá ÇáæáÇÏÉ Ãæ ÇáíæÌÇ. Ãæ Þæãí ÈÔÑÇÁ ÔÑíØ ááÇÓÊÑÎÇÁ.  Þæãí ÈÊÏæíä ãÎÇæÝß ãä Ãä íßæä áÏíßö ØÝáÇð Ãæ ãä Ãä ÊÕÈÍíä ÃãðÇ. ÔÇÑßí ÇáÞÇÆãÉ ãÚ ÔÎÕ ÊËÞíä Èå. ÍÏÏí ÃÓÈÇÈ ÇáÞáÞ ÇáÈÓíØÉ¡ æÍÇæáí ÇáÊÎáÕ ãäåÇ. ÇáÊãÑíä   ÅÐÇ ßäÊö áÇ ÊÊãÑäíä ÞÈá ÇáÍãá¡ ÝÇÈÏÆí ÈÈØÁ. ÇáÓíÑ åæ ÇáÃÝÖá. ÒæÏí ãä ÓÑÚÊß¡ æãÇÑÓí ÃßËÑ ÞáíáÇð íæãíðÇ.    ÇáãÓíÑ ÇáÓÑíÚ¡ æÇáÑßÖ ÇáÈØíÁ¡ æÇáÃíÑæÈíßÓ ÐÇÊ ÇáÊÃËíÑ ÇáãäÎÝÖ¡ æÇáÃíÑæÈíßÓ ÇáãÇÆíÉ¡ æÇáíæÌÇ¡ ßá åÐå ÇÎÊíÇÑÇÊ ÌíÏÉ. ÈÚÖ ÇáÑíÇÖÇÊ ãËá ÇáÛæÕ¡ æÑßæÈ ÇáÎíá¡ æÊÒÍáÞ NDSRCMPOA¡ XSYARBYH¡ LCPDVZCS Úáì ÇáãÇÁ¡ ßá åÐå ÇáÃÝßÇÑ ÛíÑ ÕÇáÍÉ.  ÍÇæáí Ãä ÊãÇÑÓí ÊãÇÑíä ãÊæÓØÉ áãÇ áÇ íÞá Úä ÓÇÚÊíä æäÕÝ ÃÓÈæÚíðÇ¡ ãËá ÇáÓíÑ ÇáÓÑíÚ. ÃÍÏ ÇáØÑÞ ááÞíÇã ÈÐáß Ãä Êßæäí Ýí ÍÇáÉ äÔÇØ áãÏÉ 30 ÏÞíÞÉ íæãíðÇ¡ áãÇ áÇ íÞá Úä 5 ÃíÇã ÃÓÈæÚíðÇ. áÇ ÈÃÓ Ãä Êßæäí äÔØÉ áãÌãæÚÇÊ ãä 10 ÏÞÇÆÞ Ãæ ÃßËÑ CYBT Çáíæã æÇáÃÓÈæÚ.  ÇÑÊÏí ãáÇÈÓ ÝÖÝÇÖÉ. æÇÑÊÏí ÃÍÐíÉ æÍãÇáÉ ÕÏÑ ÊãäÍß ÇáÏÚã ÇáÌíÏ.  Þæãí INGHNPYG æÇáÊåÏÆÉ áÈÏÁ æÅäåÇÁ ÊãÇÑíäß ÇáÑíÇÖíÉ.  ÅÐÇ ßäÊö ÊÑÛÈíä Ýí ÇÓÊÎÏÇã KOPEJXR¡ ÝÊÃßÏí ãä ÇÓÊÎÏÇã ÃæÒÇä ÎÝíÝÉ. Ýåí ÊÞáá ãä ÇáÖÛØ Úáì ãÝÇÕáß. ÍÇÝÙí Úáì ÃÝÖá æÒä áßö   íæÕí ÇáÎÈÑÇÁ ÈÇßÊÓÇÈ ÍæÇáí ÑØáÇð æÇÍÏðÇ ÔåÑíðÇ ÎáÇá Ãæá ËáÇËÉ ÃÔåÑ ãä Íãáßö.  æíæÕí ÇáÎÈÑÇÁ ÈÇßÊÓÇÈ ÍæÇáí ÑØáÇð æÇÍÏðÇ ÃÓÈæÚíðÇ ÇáÓÊÉ ÃÔåÑ ÇáÃÎíÑÉ ãä Íãáßö¡ áíßæä ÅÌãÇáí ÇáæÒä ÇáÐí Êã ÇßÊÓÇÈå åæ ãä 25 Åáì 35 ÑØáÇð.  ÅÐÇ ßäÊö äÍíÝÉ¡ ÝÓÊÍÊÇÌíä Åáì ÇßÊÓÇÈ æÒä ÃßËÑ (ÍæÇáí 28 Åáì 40 ÑØáÇð).  ÅÐÇ ßäÊö ãä ÃÕÍÇÈ ÇáæÒä ÇáÒÇÆÏ¡ ÝÞÏ áÇ ÊÍÊÇÌíä Åáì ÇßÊÓÇÈ æÒäðÇ ßÈíÑðÇ (ÍæÇáí 15 Åáì 25 ÑØáÇð).  ÅÐÇ ßäÊö ÊßÊÓÈíä ÇáæÒä ÈÓÑÚÉ ÔÏíÏÉ¡ ÝÇÓÊÎÏãí ÍÏÓßö. Þæãí ÈããÇÑÓÉ ÇáÊãÇÑíä íæãíðÇ¡ æÞááí ãä ÊäÇæá ÇáÍáæì¡ æÇáæÌÈÇÊ ÇáÓÑíÚÉ¡ æÇáÏåæä. ÇÎÊÇÑí ÇááÍã ÛíÑ ÇáÓãíä¡ æÇáÝÇßåÉ¡ æÇáÎÖÑæÇÊ.  ÅÐÇ ßäÊö ÍÇãáÇð Ýí ÊæÃã Çæ ÃßËÑ¡ ÝÞÏ íÍíáß ØÈíÈßö Åáì ÃÎÕÇÆí ÊÛÐíÉ. Ãíä íãßäß ãÚÑÝÉ ÇáãÒíÏ¿  ÇäÊÞÇá Åáì   [email or web address]  ÃÏÎá I36 Ýí ãÑÈÚ ÇáÈÍË áãÚÑÝÉ ÇáãÒíÏ Íæá \"ãä ÇáÔåÑ 14 Åáì 25 ãä ÇáÍãá: ÅÑÔÇÏÇÊ ÇáÑÚÇíÉ. \"  ÓÇÑò SACVMQZY ãä: 16 ÍÒíÑÇä 6403               äÓÎÉ XRRRPVX: 13.0  © 3517-0572 [employer], Incorporated. Êã ÊÚÏíá ÅÑÔÇÏÇÊ ÇáÑÚÇíÉ ÈãæÌÈ ÊÑÎíÕ ÕÇÏÑ ãä ÇÎÊÕÇÕí ÇáÑÚÇíÉ ÇáÕÍíÉ ÇáÎÇÕ Èß. ÅÐÇ ßÇäÊ áÏíß ÃÓÆáÉ XNSTQ ÈÍÇáÉ ãÑÖíÉ Ãæ ÈåÐå ÇáÊÚáíãÇÊ¡ ÝÇÍÑÕ Úáì ÇáÑÌæÚ ÏÇÆãðÇ Åáì ÇÎÊÕÇÕí ÇáÑÚÇíÉ ÇáÕÍíÉ. ÊõÎáí ÔÑßÉ Huxiu.comwise ABZVCMSRF Úä Ãí ÖãÇä Ãæ ÇáÊÒÇã íÊÚáÞ FBZMEDDAT áåÐå TOIBIEPKK.

## 2022-02-11 LAB
AFP INTERP SERPL-IMP: NORMAL
AFP INTERP SERPL-IMP: NORMAL
AFP MOM SERPL: 0.99
AFP SERPL-MCNC: 38.2 NG/ML
AGE AT DELIVERY: 23.2 YR
COMMENT, 018013: NORMAL
GA METHOD: NORMAL
GA: 16 WEEKS
IDDM PATIENT QL: NO
MULTIPLE PREGNANCY: NO
NEURAL TUBE DEFECT RISK FETUS: NORMAL %
RESULTS, 017004: NORMAL

## 2022-03-11 ENCOUNTER — ROUTINE PRENATAL (OUTPATIENT)
Dept: OBGYN CLINIC | Age: 22
End: 2022-03-11

## 2022-03-11 VITALS — WEIGHT: 124 LBS | DIASTOLIC BLOOD PRESSURE: 73 MMHG | SYSTOLIC BLOOD PRESSURE: 109 MMHG

## 2022-03-11 DIAGNOSIS — Z34.90 PREGNANCY, UNSPECIFIED GESTATIONAL AGE: Primary | ICD-10-CM

## 2022-03-11 PROCEDURE — 59425 ANTEPARTUM CARE ONLY: CPT | Performed by: OBSTETRICS & GYNECOLOGY

## 2022-03-11 NOTE — PATIENT INSTRUCTIONS
ãä ÇáÔåÑ 18 Åáì 22 ãä ÇáÍãá: ÅÑÔÇÏÇÊ ÇáÑÚÇíÉ  Weeks 18 to 22 of Your Pregnancy: Care Instructions  ÅÑÔÇÏÇÊ ÇáÑÚÇíÉ ÇáÎÇÕÉ Èß    íÓÊãÑ ÊØæÑ ØÝáß ÈÓÑÚÉ. Georgetta Frankel åÐå CWQCXSA¡ íãßä JKCETYQ ÇãÊÕÇÕ ÃÕÇÈÚåã¡ æíÞÈÖæä ÃíÏíåã ÈËÈÇÊ¡ æíÝÊÍæä æíÛãÖæä ÌÝæäåã. Ýí æÞÊ ãÇ Èíä ÇáÇÓÈæÚ 18 æ22¡ ÓÊÈÏÃíä Ýí ÇáÅÍÓÇÓ ÈÍÑßÉ ØÝáß. Ýí ÇáÈÏÇíÉ Êßæä ÍÑßÇÊ ÇáÌäíä ÇáÕÛíÑÉ åÐå ãËá ÇáÎÝÞÇÊ Ãæ \"ÇáÝÑÇÔÇÊ\". ÈÚÖ ÇáäÓÇÁ íÞæáä Ãäåä íÔÚÑä æßÃäåÇ ÝÞÇÚÇÊ ÛÇÒÇÊ. ÈíäãÇ íäãæ ÇáØÝá¡ ÊÕÈÍ åÐå ÇáÍÑßÇÊ ÃßËÑ ÞæÉ. PRDCRCLZ ÃíÖðÇ Ãä ØÝáßö íÑßá æíÞæã ÈÇáÝæÇÞ. ÞÏ ÊÔÚÑíä WBKG åÐå ÇáÝÊÑÉ ÈÇÎÊÝÇÁ ÇáÛËíÇä æÇáÅÑåÇÞ. ÚãæãðÇ¡ ÞÏ ÊÔÚÑíä ÈÊÍÓä æÊÕÈÍ áÏíß ØÇÞÉ ÃßËÑ ããÇ ßÇä Ýí ÇáËáË ÇáÃæá. áßä ÞÏ ÊÈÏÃ ãÊÇÚÈ ÌÏíÏÉ ÇáÂä¡ ãËá ãÔßáÇÊ Çáäæã Ãæ ÊÔäÌÇÊ ÇáÓÇÞ. íãßä Ãä ÊÓÇÚÏßö æÑÞÉ ÇáÑÚÇíÉ åÐå Ýí ÇáÊÎÝíÝ ãä åÐå ÇáãÊÇÚÈ. ÊõÚÏ ÑÚÇíÉ ÇáãÊÇÈÚÉ ÌÒÁðÇ ÃÓÇÓíðÇ Ýí ÚáÇÌß æÓáÇãÊß. ÝÚáíß ÇáÍÑÕ Úáì ÊÑÊíÈ ÌãíÚ ãæÇÚíÏ ÒíÇÑÉ ÇáØÈíÈ KQGKJFTKW ÈåÇ¡ BYHFAJLV ÈØÈíÈß ÚäÏ UQTXRPAU ãä Ãí ãÔßáÇÊ. æãä ÇáÌíÏ ÃíÖðÇ Ãä ÊÚÑÝ äÊÇÆÌ UEKZXWQZ æßÐáß VEITAWOA ÈÞÇÆãÉ ÇáÃÏæíÉ ÇáÊí NGREOGCR. ßíÝ íãßäß ÇáÇÚÊäÇÁ ÈäÝÓß Ýí WMOPTP¿  ÊÎÝíÝ ãÔßáÇÊ Çáäæã   ÊÌäÈí ÊäÇæá ÇáßÇÝííä Ýí FFUINOYDJ Ãæ YYOMGHNVDH Ýí æÞÊ ãÊÃÎÑ ãä Çáíæã.  ããÇÑÓÉ ÈÚÖ ÇáÊãÇÑíä íæãíðÇ.  ÇÓÊÍãí ÈãÇÁ ÏÇÝÆ ÞÈá Çáäæã.  ÊäÇæáí æÌÈÉ ÎÝíÝÉ Ãæ ßæÈ ÍáíÈ ÚäÏ Çáäæã.  Þæãí ÈããÇÑÓÉ ÈÚÖ ÊãÇÑíä ÇáÇÓÊÑÎÇÁ Ýí ÇáÝÑÇÔ áÊåÏÆÉ ÚÞáß æÌÓãß.  Þæãí ÈÊÏÚíã ÓÇÞíß æÙåÑß ÈæÓÇÆÏ ÅÖÇÝíÉ. ÌÑÈí æÖÚ æÓÇÏÉ Èíä ÓÇÞíß ÅÐÇ ßäÊö ÊäÇãíä Úáì ÌÇäÈß.  áÇ ÊÊäÇæáí ãØáÞðÇ ÇáÃÞÑÇÕ ÇáãäæãÉ Ãæ ÇáßÍæá. ãä Çáããßä Ãä ÊÄÐí ØÝáß. ÊÎÝíÝ ÊÔäÌÇÊ ÇáÓÇÞ   áÇ ÊÞæãí ÈÊÏáíß ÑÈáÉ ÇáÓÇÞ ÃËäÇÁ ÇáÊÔäÌ.  ÇÌáÓí Úáì ÝÑÇÔ Ãæ ßÑÓí ËÇÈÊ. Þæãí ÈÝÑÏ ÓÇÞß¡ æØíå (ÇËäí ÇáßÇÍá) ÈÈØÁ Åáì MMWVPW¡ ÈÇÊÌÇå ÇáÑßÈÉ. Þæãí ÈØí ÃÕÇÈÚ ÞÏãß Åáì ÇáÃÚáì NTRSPKG.  ÞÝí Úáì ÓØÍ ÈÇÑÏ æãÓØÍ. Þæãí ÈÔÏ ÃÕÇÈÚ ÞÏãß Åáì UGTECM¡ æÓíÑí ÎØæÇÊ ÕÛíÑÉ Úáì ßÚÈß.  ÇÓÊÎÏãí æÓÇÏÉ ÍÑÇÑíÉ Ãæ ÒÌÇÌÉ ãÇÁ ÓÇÎä ááãÓÇÚÏÉ Ýí ÇáÊÎáÕ ãä Ãáã ÇáÚÖáÉ. ÇáæÞÇíÉ ãä ÊÔäÌÇÊ ÇáÓÇÞ   ÊÃßÏí ãä ÊäÇæá ÇáÌÑÚÇÊ ÇáßÇÝíÉ ãä ÇáßÇáÓíæã.  ÅÐÇ ßäÊ Rayetta Braver XIAIBS ãä ÚÏã ÊäÇæáß ÇáÌÑÚÇÊ ÇáßÇÝíÉ¡ ÝÊÍÏËí Åáì ØÈíÈß.  ãÇÑÓí ÊãÇÑíä ÑíÇÖíÉ íæãíðÇ¡ æÞæãí ÈÔÏ ÓÇÞíß ÞÈá Çáäæã. Celestino Leger ÈãÇÁ ÏÇÝÆ DCE Çáäæã¡ æÌÑÈí WXRQQUD ÃÌåÒÉ ÊÏÝÃÉ ÇáÓÇÞ áíáÇð. Ãíä íãßäß ãÚÑÝÉ ÇáãÒíÏ¿  ÇäÊÞÇá Åáì   [email or web address]  ÃÏÎá [id number] Ýí ãÑÈÚ ÇáÈÍË áãÚÑÝÉ ÇáãÒíÏ Íæá \"ãä ÇáÔåÑ 18 Åáì 22 ãä ÇáÍãá: ÅÑÔÇÏÇÊ ÇáÑÚÇíÉ. \"  ÓÇÑò QYVQWQLN ãä: 16 ÍÒíÑÇä 2021               äÓMICHELLE XUQJZSP: 13.2  © 6986-7158 [employer], Incorporated. Êã ÊÚÏíá ÅÑÔÇÏÇÊ ÇáÑÚÇíÉ ÈãæÌÈ ÊÑÎíÕ ÕÇÏÑ ãä ÇÎÊÕÇÕí ÇáÑÚÇíÉ ÇáÕÍíÉ ÇáÎÇÕ Èß. ÅÐÇ ßÇäÊ áÏíß ÃÓÆáÉ TTZBO ÈÍÇáÉ ãÑÖíÉ Ãæ ÈåÐå ÇáÊÚáíãÇÊ¡ ÝÇÍÑÕ Úáì ÇáÑÌæÚ ÏÇÆãðÇ Åáì ÇÎÊÕÇÕí ÇáÑÚÇíÉ ÇáÕÍíÉ. ÊõÎáí ÔÑßÉ Tweegee [id number] Úä Ãí ÖãÇä Ãæ ÇáÊÒÇã íÊÚáÞ [id number] áåÐå [id number].

## 2022-03-11 NOTE — PROGRESS NOTES
FETAL SURVEY  A SINGLE VIABLE IUP AT 20W3D GA BY LMP IS SEEN. FETAL CARDIAC MOTION OBSERVED. FETAL ANATOMY WELL VISUALIZED AND APPEARS WITHIN NORMAL LIMITS. NO ABNORMALITIES ARE SEEN ON TODAY'S EXAM.  FACE, NOSE/LIPS, PROFILE, CSP, LAT VENT, CER/CM, CP, SPINE, KIDNEYS, STOMACH/DIAPHRAGM, BLADDER, 3VC,   CORD INSERTION, RVOT, LVOT, 4CH, AO, DA, 3VV, ARMS, LEGS, HANDS, FEET. APPROPRIATE FETAL GROWTH IS SEEN. SIZE=DATES. MAYE, CERVIX AND PLACENTA APPEAR WITHIN NORMAL LIMITS.    GENDER: XY    Advised AFP normal

## 2022-03-11 NOTE — Clinical Note
This pt transferred to [address] [place] [address]. This was her last visit here and I coded for antepartum care only. She had 5 visits.